# Patient Record
Sex: FEMALE | Race: WHITE | NOT HISPANIC OR LATINO | Employment: FULL TIME | URBAN - METROPOLITAN AREA
[De-identification: names, ages, dates, MRNs, and addresses within clinical notes are randomized per-mention and may not be internally consistent; named-entity substitution may affect disease eponyms.]

---

## 2020-01-26 PROCEDURE — 87209 SMEAR COMPLEX STAIN: CPT | Performed by: EMERGENCY MEDICINE

## 2020-01-26 PROCEDURE — 87427 SHIGA-LIKE TOXIN AG IA: CPT | Performed by: EMERGENCY MEDICINE

## 2020-01-26 PROCEDURE — 87205 SMEAR GRAM STAIN: CPT | Performed by: EMERGENCY MEDICINE

## 2020-01-26 PROCEDURE — 87045 FECES CULTURE AEROBIC BACT: CPT | Performed by: EMERGENCY MEDICINE

## 2020-01-26 PROCEDURE — 87046 STOOL CULTR AEROBIC BACT EA: CPT | Performed by: EMERGENCY MEDICINE

## 2020-01-26 PROCEDURE — 87177 OVA AND PARASITES SMEARS: CPT | Performed by: EMERGENCY MEDICINE

## 2020-01-27 PROCEDURE — 87493 C DIFF AMPLIFIED PROBE: CPT | Performed by: EMERGENCY MEDICINE

## 2020-01-30 ENCOUNTER — TELEPHONE (OUTPATIENT)
Dept: URGENT CARE | Facility: CLINIC | Age: 25
End: 2020-01-30

## 2020-01-30 NOTE — TELEPHONE ENCOUNTER
Notified Ms. Petersen of stool study results. She states she continues to have symptoms but has followed up with her PCP for further evaluation and treatment.

## 2020-01-31 ENCOUNTER — TELEPHONE (OUTPATIENT)
Dept: URGENT CARE | Facility: CLINIC | Age: 25
End: 2020-01-31

## 2021-04-09 PROCEDURE — U0004 COV-19 TEST NON-CDC HGH THRU: HCPCS | Performed by: FAMILY MEDICINE

## 2021-09-02 PROCEDURE — U0004 COV-19 TEST NON-CDC HGH THRU: HCPCS | Performed by: FAMILY MEDICINE

## 2021-09-03 ENCOUNTER — TELEPHONE (OUTPATIENT)
Dept: URGENT CARE | Facility: CLINIC | Age: 26
End: 2021-09-03

## 2021-09-03 NOTE — TELEPHONE ENCOUNTER
----- Message from Ramirez Trotter MD sent at 9/3/2021  3:36 PM EDT -----  Please inform patient of negative lab result    ----- Message -----  From: Lab, Background User  Sent: 9/3/2021   3:28 PM EDT  To:  [facility] [address] Cov[facility] Results

## 2021-10-11 PROCEDURE — U0004 COV-19 TEST NON-CDC HGH THRU: HCPCS | Performed by: FAMILY MEDICINE

## 2021-10-12 ENCOUNTER — TELEPHONE (OUTPATIENT)
Dept: URGENT CARE | Facility: CLINIC | Age: 26
End: 2021-10-12

## 2021-10-14 ENCOUNTER — TELEPHONE (OUTPATIENT)
Dept: URGENT CARE | Facility: CLINIC | Age: 26
End: 2021-10-14

## 2021-10-14 NOTE — TELEPHONE ENCOUNTER
----- Message from Tavo Coppola MD sent at 10/12/2021  1:25 PM EDT -----  COVID is not detected.  Continue the recommended quarantine protocol of 7 days from exposure if you are asymptomatic or 10 days from onset of symptoms if you are symptomatic.  With symptoms you would also need to be without fever for 24 hours and have improvement of symptoms.-Tavo Coppola MD      ----- Message -----  From: Lab, Background User  Sent: 10/12/2021  11:36 AM EDT  To:  [facility] [address] [facility] Results

## 2022-01-06 ENCOUNTER — TELEPHONE (OUTPATIENT)
Dept: NEUROLOGY | Facility: CLINIC | Age: 27
End: 2022-01-06

## 2022-01-06 NOTE — TELEPHONE ENCOUNTER
Provider: DR. MATHEW  Caller: RAUDEL   Relationship to Patient: PT   Phone Number: [phone]  Reason for Call: PT IS CHECKING TO SEE WHAT THE PROCESS FOR BOTOX IS AND IF THERE IS AN EXPEDITED PROCESS AS SHE HAS BEEN GETTING HER BOTOX TREATMENT FROM [facility] AND THAT IS WEARING OFF. 10/29 WAS WHEN SHE LAST RECEIVED BOTOX @ [facility] AND HER CONSULT WITH OUR OFFICE IS NOT UNTIL 1/20, PLEASE REVIEW AND ADVISE ON IF THERE IS AN EXPEDITED PROCESS.

## 2022-01-18 NOTE — TELEPHONE ENCOUNTER
Called patient and let her know that since she will be new to our office that Dr. Domínguez will order the botox on 01/20/2022. She stated that Dr. Thomas office will do the botox on Friday and then we will start the process for her next injection

## 2022-01-20 ENCOUNTER — OFFICE VISIT (OUTPATIENT)
Dept: NEUROLOGY | Facility: CLINIC | Age: 27
End: 2022-01-20

## 2022-01-20 VITALS
OXYGEN SATURATION: 97 % | BODY MASS INDEX: 22.64 KG/M2 | DIASTOLIC BLOOD PRESSURE: 68 MMHG | HEIGHT: 63 IN | SYSTOLIC BLOOD PRESSURE: 102 MMHG | WEIGHT: 127.8 LBS | HEART RATE: 76 BPM

## 2022-01-20 DIAGNOSIS — G43.009 MIGRAINE WITHOUT AURA AND WITHOUT STATUS MIGRAINOSUS, NOT INTRACTABLE: Primary | ICD-10-CM

## 2022-01-20 PROCEDURE — 99203 OFFICE O/P NEW LOW 30 MIN: CPT | Performed by: PSYCHIATRY & NEUROLOGY

## 2022-01-20 RX ORDER — TOPIRAMATE 25 MG/1
25 TABLET ORAL 2 TIMES DAILY
COMMUNITY
End: 2022-02-17 | Stop reason: SDUPTHER

## 2022-01-20 NOTE — PROGRESS NOTES
Subjective:    CC: Danica Petersen is seen today in consultation at the request of Self Referring for Migraine       HPI:  26-year-old female with a past medical history of anxiety, depression presents with headaches. As per patient she has had headaches for over 10 years. She has tried several different medications for her headaches including Topamax that initially caused some memory problems, amitriptyline that was not effective and Emgality that caused IBS-like symptoms. About 5 years ago she started to get Botox shots which have helped tremendously. She wants to switch care to us  [facility] About 6 months ago she also restarted taking Topamax as she had started to get increased frequency of headaches and is now taking a dose of 50 mg twice a day . She currently has about 1-2 headaches a week either on one or both sides of the head with occasional nausea (previously had severe nausea and vomiting), photophobia and phonophobia. Takes Relpax 20 mg which does help.  Of note-with regards to her Botox she states that her provider was giving her a few injections in her masseter as she had increased jaw stiffness. They were not giving her the trapezius shots (even though she has a lot of tension in her shoulders) as that caused her to have slumping of her shoulders.    The following portions of the patient's history were reviewed today and updated as of 01/20/2022  : allergies, current medications, past family history, past medical history, past social history, past surgical history and problem list  These document will be scanned to patient's chart.      Current Outpatient Medications:   •  eletriptan (RELPAX) 20 MG tablet, May repeat in 2 hours if unresolved. Do not exceed 80 mg in 24 hours., Disp: , Rfl:   •  fexofenadine (ALLEGRA) 180 MG tablet, Take 180 mg by mouth Daily., Disp: , Rfl:   •  fluticasone (Flonase Sensimist) 27.5 MCG/SPRAY nasal spray, Flonase Sensimist 27.5  "mcg/actuation nasal spray,suspension, Disp: , Rfl:   •  MAGNESIUM PO, magnesium 250 mg tablet  Take 2 tablets every day by oral route., Disp: , Rfl:   •  Norethin-Eth Estrad-Fe Biphas (LO LOESTRIN FE) 1 MG-10 MCG / 10 MCG tablet, Take  by mouth Daily., Disp: , Rfl:   •  predniSONE (DELTASONE) 10 MG tablet, Daily taper - 6/5/4/3/2/1, Disp: 21 tablet, Rfl: 0  •  promethazine-codeine (PHENERGAN with CODEINE) 6.25-10 MG/5ML syrup, Take 5 ml every 4 hours as needed for cough or pain, Disp: 90 mL, Rfl: 0  •  sertraline (ZOLOFT) 100 MG tablet, Take 75 mg by mouth Daily., Disp: , Rfl:   •  topiramate (TOPAMAX) 25 MG tablet, Take 25 mg by mouth 2 (Two) Times a Day. Taking 2 tablets at night daily, Disp: , Rfl:   •  topiramate (TOPAMAX) 50 MG tablet, 50 mg Daily. Taking one tablet in morning once daily, Disp: , Rfl:    Past Medical History:   Diagnosis Date   • Anxiety    • Depression    • Insomnia    • Migraine       Past Surgical History:   Procedure Laterality Date   • COLONOSCOPY     • ENDOSCOPY     • TONSILLECTOMY        Family History   Problem Relation Age of Onset   • Breast cancer Mother    • Hypertension Mother    • Migraines Mother    • Neuropathy Father       Social History     Socioeconomic History   • Marital status: Single   Tobacco Use   • Smoking status: Never Smoker   • Smokeless tobacco: Never Used   Vaping Use   • Vaping Use: Never used   Substance and Sexual Activity   • Alcohol use: Defer   • Drug use: No   • Sexual activity: Defer     Review of Systems   Neurological: Positive for headache.   All other systems reviewed and are negative.      Objective:    /68   Pulse 76   Ht 160 cm (63\")   Wt 58 kg (127 lb 12.8 oz)   SpO2 97%   Breastfeeding No   BMI 22.64 kg/m²     Neurology Exam:    General apperance: NAD.     Mental status: Alert, awake and oriented to time place and person.    Recent and Remote memory: Intact.    Attention span and Concentration: Normal.     Language and Speech: Intact- " No dysarthria.    Fluency, Naming , Repitition and Comprehension:  Intact    Cranial Nerves:   CN II: Visual fields are full. Intact. Fundi - Normal, No papillederma, Pupils - SAHRA  CN III, IV and VI: Extraocular movements are intact. Normal saccades.   CN V: Facial sensation is intact.   CN VII: Muscles of facial expression reveal no asymmetry. Intact.   CN VIII: Hearing is intact. Whispered voice intact.   CN IX and X: Palate elevates symmetrically. Intact  CN XI: Shoulder shrug is intact.   CN XII: Tongue is midline without evidence of atrophy or fasciculation.     Ophthalmoscopic exam of optic disc-normal    Motor:  Right UE muscle strength 5/5. Normal tone.     Left UE muscle strength 5/5. Normal tone.      Right LE muscle strength5/5. Normal tone.     Left LE muscle strength 5/5. Normal tone.      Sensory: Normal light touch, vibration and pinprick sensation bilaterally.    DTRs: 2+ bilaterally in upper and lower extremities.    Babinski: Negative bilaterally.    Co-ordination: Normal finger-to-nose, heel to shin B/L.    Rhomberg: Negative.    Gait: Normal.    Cardiovascular: Regular rate and rhythm without murmur, gallop or rub.    Assessment and Plan:  1. Migraine without aura and without status migrainosus, not intractable  Patient has tried several different medications including Emgality, amitriptyline, Topamax  She is currently on Topamax 50 mg twice a day and has been getting Botox injections (last injection was 3 months ago )   We will schedule her for the next Botox once approved by insurance in 3 months as she will be getting her next Botox next week by pain management at [facility]  She can continue Relpax 20 mg as needed.  I have also counseled her to keep her self well-hydrated    Return in about 6 weeks (around 3/3/2022) for Botox.     I spent over 35 minutes with the patient face to face out of which over 50% (30 minutes) was spent in management, instructions and education.     Lili  MD Sang

## 2022-02-17 PROCEDURE — U0004 COV-19 TEST NON-CDC HGH THRU: HCPCS | Performed by: FAMILY MEDICINE

## 2022-05-31 ENCOUNTER — TELEPHONE (OUTPATIENT)
Dept: NEUROLOGY | Facility: CLINIC | Age: 27
End: 2022-05-31

## 2022-05-31 NOTE — TELEPHONE ENCOUNTER
Called patient to ask about patient's insurance or a RX card.  Patient said she is no longer wanting to have Botox thru [facility], or Dr. Domínguez.